# Patient Record
Sex: FEMALE | Race: WHITE | Employment: FULL TIME | URBAN - METROPOLITAN AREA
[De-identification: names, ages, dates, MRNs, and addresses within clinical notes are randomized per-mention and may not be internally consistent; named-entity substitution may affect disease eponyms.]

---

## 2019-01-29 ENCOUNTER — OFFICE VISIT (OUTPATIENT)
Dept: INTERNAL MEDICINE CLINIC | Facility: CLINIC | Age: 59
End: 2019-01-29
Payer: COMMERCIAL

## 2019-01-29 VITALS
DIASTOLIC BLOOD PRESSURE: 82 MMHG | WEIGHT: 149.81 LBS | BODY MASS INDEX: 25.57 KG/M2 | SYSTOLIC BLOOD PRESSURE: 134 MMHG | TEMPERATURE: 98 F | HEIGHT: 64 IN | HEART RATE: 64 BPM

## 2019-01-29 DIAGNOSIS — M79.641 PAIN IN BOTH HANDS: ICD-10-CM

## 2019-01-29 DIAGNOSIS — M79.642 PAIN IN BOTH HANDS: ICD-10-CM

## 2019-01-29 DIAGNOSIS — R20.0 NUMBNESS IN BOTH HANDS: Primary | ICD-10-CM

## 2019-01-29 PROCEDURE — 99203 OFFICE O/P NEW LOW 30 MIN: CPT | Performed by: INTERNAL MEDICINE

## 2019-01-29 RX ORDER — PREDNISONE 1 MG/1
TABLET ORAL
Refills: 1 | COMMUNITY
Start: 2018-10-08 | End: 2019-06-10

## 2019-01-29 RX ORDER — MELOXICAM 15 MG/1
TABLET ORAL
Refills: 3 | COMMUNITY
Start: 2018-12-27

## 2019-01-29 RX ORDER — GABAPENTIN 100 MG/1
100 CAPSULE ORAL NIGHTLY
Qty: 30 CAPSULE | Refills: 2 | Status: SHIPPED | OUTPATIENT
Start: 2019-01-29

## 2019-01-29 NOTE — PROGRESS NOTES
Freddy Schmitt is a 60year old female.   Patient presents with:  New Patient: cn room 3: patient is here for numbing pain  in R arm, patient also feels it in her hand an feels like its getting worse , getting worse       HPI:      For the last 6 months, pa Patient Position: Sitting, Cuff Size: adult)   Pulse 64   Temp 97.7 °F (36.5 °C) (Oral)   Ht 64\"   Wt 149 lb 12.8 oz   BMI 25.71 kg/m²   GENERAL: well developed, NAD  LUNGS: normal rate without respiratory distress, lungs clear to auscultation  CARDIO: RR

## 2019-02-05 LAB
ABSOLUTE BASOPHILS: 60 CELLS/UL (ref 0–200)
ABSOLUTE EOSINOPHILS: 270 CELLS/UL (ref 15–500)
ABSOLUTE LYMPHOCYTES: 2805 CELLS/UL (ref 850–3900)
ABSOLUTE MONOCYTES: 420 CELLS/UL (ref 200–950)
ABSOLUTE NEUTROPHILS: 3945 CELLS/UL (ref 1500–7800)
ALBUMIN/GLOBULIN RATIO: 1.4 (CALC) (ref 1–2.5)
ALBUMIN: 4.2 G/DL (ref 3.6–5.1)
ALKALINE PHOSPHATASE: 103 U/L (ref 33–130)
ALT: 13 U/L (ref 6–29)
AST: 18 U/L (ref 10–35)
BASOPHILS: 0.8 %
BILIRUBIN, TOTAL: 0.6 MG/DL (ref 0.2–1.2)
BUN: 11 MG/DL (ref 7–25)
CALCIUM: 9.4 MG/DL (ref 8.6–10.4)
CARBON DIOXIDE: 27 MMOL/L (ref 20–32)
CHLORIDE: 103 MMOL/L (ref 98–110)
CREATININE: 0.77 MG/DL (ref 0.5–0.99)
EGFR IF AFRICN AM: 97 ML/MIN/1.73M2
EGFR IF NONAFRICN AM: 84 ML/MIN/1.73M2
EOSINOPHILS: 3.6 %
GLOBULIN: 2.9 G/DL (CALC) (ref 1.9–3.7)
GLUCOSE: 92 MG/DL (ref 65–99)
HEMATOCRIT: 39 % (ref 35–45)
HEMOGLOBIN A1C: 5.2 % OF TOTAL HGB
HEMOGLOBIN: 13 G/DL (ref 11.7–15.5)
LYMPHOCYTES: 37.4 %
MCH: 29 PG (ref 27–33)
MCHC: 33.3 G/DL (ref 32–36)
MCV: 87.1 FL (ref 80–100)
MONOCYTES: 5.6 %
MPV: 9.7 FL (ref 7.5–12.5)
NEUTROPHILS: 52.6 %
PLATELET COUNT: 326 THOUSAND/UL (ref 140–400)
POTASSIUM: 4.1 MMOL/L (ref 3.5–5.3)
PROTEIN, TOTAL: 7.1 G/DL (ref 6.1–8.1)
RDW: 12.9 % (ref 11–15)
RED BLOOD CELL COUNT: 4.48 MILLION/UL (ref 3.8–5.1)
SODIUM: 139 MMOL/L (ref 135–146)
TSH W/REFLEX TO FT4: 2.44 MIU/L (ref 0.4–4.5)
VITAMIN B12: 461 PG/ML (ref 200–1100)
WHITE BLOOD CELL COUNT: 7.5 THOUSAND/UL (ref 3.8–10.8)

## 2019-02-12 ENCOUNTER — TELEPHONE (OUTPATIENT)
Dept: INTERNAL MEDICINE CLINIC | Facility: CLINIC | Age: 59
End: 2019-02-12

## 2019-02-12 NOTE — TELEPHONE ENCOUNTER
Ok to take tylenol and gabapentin at same time.  Lets see what the work up shows before we can decide on what else can help with pain

## 2019-02-12 NOTE — TELEPHONE ENCOUNTER
Spoke with patient informed per TB ok to take Tylenol and gabapentin at the same time. Lets see what the work up shows before we can decide on what else can help with pain. Patient verbalized understanding and agreeable to POC.

## 2019-02-12 NOTE — TELEPHONE ENCOUNTER
Pt called and stated that her hand pain is much worse and is becoming daily and she isnt able to get the EMG done that TB ordered until Feb 26th and would like to know if there is anything else we can do t ohelp with the pain

## 2019-02-12 NOTE — TELEPHONE ENCOUNTER
Spoke with patient states continues to experience numbness and pain bilateral upper extremities right greater than left, has been using wrist splints at night mostly-unable to do much during the day with them on, continues to take gabapentin, meloxicam, an

## 2019-03-04 PROBLEM — G56.03 CARPAL TUNNEL SYNDROME, BILATERAL: Status: ACTIVE | Noted: 2019-03-04

## 2019-04-23 ENCOUNTER — TELEPHONE (OUTPATIENT)
Dept: INTERNAL MEDICINE CLINIC | Facility: CLINIC | Age: 59
End: 2019-04-23

## 2019-06-10 ENCOUNTER — OFFICE VISIT (OUTPATIENT)
Dept: INTERNAL MEDICINE CLINIC | Facility: CLINIC | Age: 59
End: 2019-06-10
Payer: COMMERCIAL

## 2019-06-10 VITALS
DIASTOLIC BLOOD PRESSURE: 88 MMHG | SYSTOLIC BLOOD PRESSURE: 138 MMHG | HEART RATE: 72 BPM | WEIGHT: 149.81 LBS | RESPIRATION RATE: 16 BRPM | BODY MASS INDEX: 25.57 KG/M2 | HEIGHT: 64 IN

## 2019-06-10 DIAGNOSIS — M81.0 OSTEOPOROSIS OF MULTIPLE SITES: ICD-10-CM

## 2019-06-10 DIAGNOSIS — M31.6 GIANT CELL ARTERITIS (HCC): ICD-10-CM

## 2019-06-10 DIAGNOSIS — G56.03 CARPAL TUNNEL SYNDROME, BILATERAL: ICD-10-CM

## 2019-06-10 DIAGNOSIS — Z00.00 ROUTINE GENERAL MEDICAL EXAMINATION AT A HEALTH CARE FACILITY: Primary | ICD-10-CM

## 2019-06-10 PROCEDURE — 99396 PREV VISIT EST AGE 40-64: CPT | Performed by: INTERNAL MEDICINE

## 2019-06-10 RX ORDER — ALENDRONATE SODIUM 70 MG/1
70 TABLET ORAL
COMMUNITY
Start: 2019-05-10 | End: 2020-05-09

## 2019-06-10 RX ORDER — ACETAMINOPHEN 500 MG
1000 TABLET ORAL
COMMUNITY
Start: 2017-12-05

## 2019-06-10 RX ORDER — MULTIVIT-MIN/IRON/FOLIC ACID/K 18-600-40
1 CAPSULE ORAL
COMMUNITY

## 2019-06-10 RX ORDER — LORATADINE 10 MG/1
10 TABLET ORAL
COMMUNITY

## 2019-06-10 RX ORDER — PREDNISONE 10 MG/1
TABLET ORAL
Refills: 2 | COMMUNITY
Start: 2019-04-08

## 2019-06-10 RX ORDER — SULFAMETHOXAZOLE AND TRIMETHOPRIM 800; 160 MG/1; MG/1
1 TABLET ORAL
COMMUNITY
Start: 2019-02-22

## 2019-06-10 NOTE — PROGRESS NOTES
Patient presents with:  Physical: cn room 3: physical and discuss [facility] findings       HPI:    Patient here for cpe, has own gyne, Dr. Hou Carrier who she sees regularly.  utd on pap and mammogram (pap requested today)  cscope 2 years ago Dr. Cristel figueroa Rfl: 2   Ascorbic Acid (VITAMIN C) 500 MG Oral Cap Take 1 tablet by mouth. Disp:  Rfl:    alendronate 70 MG Oral Tab Take 70 mg by mouth. Disp:  Rfl:    acetaminophen 500 MG Oral Tab Take 1,000 mg by mouth.  Disp:  Rfl:    Esomeprazole Magnesium 20 MG Oral icterus. Neck: Normal range of motion. Neck supple. Cardiovascular: Normal rate, regular rhythm and intact distal pulses. Pulmonary/Chest: Effort normal and breath sounds normal.   Abdominal: Soft.  Bowel sounds are normal. Non tender, nondistended  L

## 2019-06-11 ENCOUNTER — TELEPHONE (OUTPATIENT)
Dept: INTERNAL MEDICINE CLINIC | Facility: CLINIC | Age: 59
End: 2019-06-11

## 2019-06-11 NOTE — TELEPHONE ENCOUNTER
Medical records request faxed to Dr Florentin Ward [phone] requesting last colonoscopy report , confirmation received please hold for records

## 2019-06-11 NOTE — TELEPHONE ENCOUNTER
Medical records request faxed to Dr. Carmen Payton  for last pap smear to be faxed over confirmation received please hold for records

## 2021-04-22 ENCOUNTER — TELEPHONE (OUTPATIENT)
Dept: INTERNAL MEDICINE CLINIC | Facility: CLINIC | Age: 61
End: 2021-04-22

## 2021-04-23 NOTE — TELEPHONE ENCOUNTER
CPE   Future Appointments   Date Time Provider Altagracia Quintero   5/20/2021  2:20 PM Erich Hernandez MD [facility]        Pt had labs done in Feb and stated she will send the results by mail.  She doesn't think she needs to repeat any labs at [facility]

## 2021-04-23 NOTE — TELEPHONE ENCOUNTER
HM includes multiple gaps including CPE and pap. Last CPE done 2019    FWD to PSR, please assist in scheduling CPE  Route back for lab orders.

## 2021-04-23 NOTE — TELEPHONE ENCOUNTER
Spoke with Ernestine Castaneda. Pt recently had labs / [facility] shows as follows. .    CBC done 4/2021  Lipid done 2/2021  Creatinine/ALT done 4/2021    Pt states she will be having repeat labs done again with [place] in 6/2021.   Wonders if lab orde

## 2021-05-20 ENCOUNTER — OFFICE VISIT (OUTPATIENT)
Dept: INTERNAL MEDICINE CLINIC | Facility: CLINIC | Age: 61
End: 2021-05-20
Payer: COMMERCIAL

## 2021-05-20 VITALS
OXYGEN SATURATION: 97 % | HEIGHT: 64.5 IN | SYSTOLIC BLOOD PRESSURE: 134 MMHG | RESPIRATION RATE: 16 BRPM | TEMPERATURE: 98 F | DIASTOLIC BLOOD PRESSURE: 84 MMHG | WEIGHT: 160 LBS | HEART RATE: 75 BPM | BODY MASS INDEX: 26.98 KG/M2

## 2021-05-20 DIAGNOSIS — Z00.00 ROUTINE GENERAL MEDICAL EXAMINATION AT A HEALTH CARE FACILITY: Primary | ICD-10-CM

## 2021-05-20 DIAGNOSIS — M31.6 GIANT CELL ARTERITIS (HCC): ICD-10-CM

## 2021-05-20 DIAGNOSIS — R14.0 ABDOMINAL BLOATING: ICD-10-CM

## 2021-05-20 DIAGNOSIS — R06.00 DOE (DYSPNEA ON EXERTION): ICD-10-CM

## 2021-05-20 PROBLEM — R06.09 DOE (DYSPNEA ON EXERTION): Status: ACTIVE | Noted: 2021-05-20

## 2021-05-20 PROCEDURE — 99396 PREV VISIT EST AGE 40-64: CPT | Performed by: INTERNAL MEDICINE

## 2021-05-20 PROCEDURE — 3075F SYST BP GE 130 - 139MM HG: CPT | Performed by: INTERNAL MEDICINE

## 2021-05-20 PROCEDURE — 3079F DIAST BP 80-89 MM HG: CPT | Performed by: INTERNAL MEDICINE

## 2021-05-20 PROCEDURE — 3008F BODY MASS INDEX DOCD: CPT | Performed by: INTERNAL MEDICINE

## 2021-05-20 RX ORDER — ALENDRONATE SODIUM 70 MG/1
70 TABLET ORAL WEEKLY
COMMUNITY
Start: 2019-05-10

## 2021-05-20 NOTE — PROGRESS NOTES
Patient presents with:  Physical: sees Gyne.        HPI:    Patient here for cpe without gyne exam  utd on pap, due for mammogram and she has order already from her gyne   utd on cscope  Giant cell arteritis- sees rheum Dr. Inge Gonzales from [facility], [facility] pre alendronate 70 MG Oral Tab Take 70 mg by mouth once a week. • Ascorbic Acid (VITAMIN C) 500 MG Oral Cap Take 1 tablet by mouth. • cholecalciferol (VITAMIN D3) 5000 units Oral Cap Take 5,000 Units by mouth daily.      • Calcium Carbonate (CALCIUM 600 Oriented to person, place, and time. No distress. HEENT:  Normocephalic and atraumatic. Hearing and tympanic membranes normal.     Eyes: Conjunctivae and EOM are normal. PERRLA. No scleral icterus. Neck: Normal range of motion. Neck supple.  Normal arambula

## 2021-05-21 ENCOUNTER — TELEPHONE (OUTPATIENT)
Dept: INTERNAL MEDICINE CLINIC | Facility: CLINIC | Age: 61
End: 2021-05-21

## 2021-05-21 NOTE — TELEPHONE ENCOUNTER
Pt had CPE yesterday with TB. She is extremely dizzy today and can barely move around. Felt like she could faint this am but caught herself, please advise?

## 2021-05-21 NOTE — TELEPHONE ENCOUNTER
Patient contacted to discuss symptoms. Patient states got up from bed this AM and when she looked to the left she felt she was going to faint. Patient states she grabbed the door frame to prevent fall. Patient states felt clammy and sweaty.  Patient states

## 2021-06-01 ENCOUNTER — LAB ENCOUNTER (OUTPATIENT)
Dept: LAB | Age: 61
End: 2021-06-01
Attending: INTERNAL MEDICINE
Payer: COMMERCIAL

## 2021-06-01 DIAGNOSIS — R06.00 DOE (DYSPNEA ON EXERTION): ICD-10-CM

## 2021-06-04 ENCOUNTER — RT VISIT (OUTPATIENT)
Dept: RESPIRATORY THERAPY | Facility: HOSPITAL | Age: 61
End: 2021-06-04
Attending: INTERNAL MEDICINE
Payer: COMMERCIAL

## 2021-06-04 DIAGNOSIS — R06.00 DOE (DYSPNEA ON EXERTION): ICD-10-CM

## 2021-06-04 PROCEDURE — 94729 DIFFUSING CAPACITY: CPT

## 2021-06-04 PROCEDURE — 94726 PLETHYSMOGRAPHY LUNG VOLUMES: CPT

## 2021-06-04 PROCEDURE — 94060 EVALUATION OF WHEEZING: CPT

## 2021-06-04 NOTE — PROCEDURES
Findings:  Postbronchodilator FEV1 is 2.49L, 101% predicted. Postbronchodilator FVC is 3.36L, 107% predicted. FEV1/ FVC ratio is 0.74. There is no significant bronchodilator response after   administration of albuterol.    The flow-volume loop demonstrat

## 2021-06-17 ENCOUNTER — HOSPITAL ENCOUNTER (OUTPATIENT)
Dept: ULTRASOUND IMAGING | Age: 61
Discharge: HOME | End: 2021-06-17
Attending: INTERNAL MEDICINE
Payer: COMMERCIAL

## 2021-06-17 DIAGNOSIS — R14.0 ABDOMINAL BLOATING: ICD-10-CM

## 2021-06-17 PROCEDURE — 76700 US EXAM ABDOM COMPLETE: CPT | Performed by: INTERNAL MEDICINE

## 2021-06-21 DIAGNOSIS — K82.4 GALLBLADDER POLYP: Primary | ICD-10-CM

## 2021-06-21 DIAGNOSIS — Z09 FOLLOW-UP EXAM, 7 MONTHS TO 1 YEAR SINCE PREVIOUS EXAM: ICD-10-CM

## 2022-02-18 ENCOUNTER — IMMUNIZATION (OUTPATIENT)
Dept: LAB | Age: 62
End: 2022-02-18
Attending: EMERGENCY MEDICINE
Payer: COMMERCIAL

## 2022-02-18 DIAGNOSIS — Z23 NEED FOR VACCINATION: Primary | ICD-10-CM

## 2022-02-18 PROCEDURE — 0051A SARSCOV2 VAC 30MCG TRS SUCR: CPT

## 2022-03-11 ENCOUNTER — TELEPHONE (OUTPATIENT)
Dept: INTERNAL MEDICINE CLINIC | Facility: CLINIC | Age: 62
End: 2022-03-11

## 2023-04-27 ENCOUNTER — OFFICE VISIT (OUTPATIENT)
Dept: INTERNAL MEDICINE CLINIC | Facility: CLINIC | Age: 63
End: 2023-04-27
Payer: COMMERCIAL

## 2023-04-27 VITALS
BODY MASS INDEX: 27.18 KG/M2 | HEIGHT: 64.57 IN | TEMPERATURE: 97 F | WEIGHT: 161.19 LBS | RESPIRATION RATE: 16 BRPM | OXYGEN SATURATION: 95 % | SYSTOLIC BLOOD PRESSURE: 128 MMHG | DIASTOLIC BLOOD PRESSURE: 80 MMHG | HEART RATE: 80 BPM

## 2023-04-27 DIAGNOSIS — M31.6 GIANT CELL ARTERITIS (HCC): ICD-10-CM

## 2023-04-27 DIAGNOSIS — I71.21 ANEURYSM OF ASCENDING AORTA WITHOUT RUPTURE (HCC): ICD-10-CM

## 2023-04-27 DIAGNOSIS — Z23 NEED FOR TDAP VACCINATION: ICD-10-CM

## 2023-04-27 DIAGNOSIS — R09.89 LABILE BLOOD PRESSURE: ICD-10-CM

## 2023-04-27 DIAGNOSIS — Z00.00 ROUTINE GENERAL MEDICAL EXAMINATION AT A HEALTH CARE FACILITY: Primary | ICD-10-CM

## 2023-04-27 DIAGNOSIS — E78.49 OTHER HYPERLIPIDEMIA: ICD-10-CM

## 2023-04-27 PROCEDURE — 3074F SYST BP LT 130 MM HG: CPT | Performed by: INTERNAL MEDICINE

## 2023-04-27 PROCEDURE — 3079F DIAST BP 80-89 MM HG: CPT | Performed by: INTERNAL MEDICINE

## 2023-04-27 PROCEDURE — 99396 PREV VISIT EST AGE 40-64: CPT | Performed by: INTERNAL MEDICINE

## 2023-04-27 PROCEDURE — 90471 IMMUNIZATION ADMIN: CPT | Performed by: INTERNAL MEDICINE

## 2023-04-27 PROCEDURE — 3008F BODY MASS INDEX DOCD: CPT | Performed by: INTERNAL MEDICINE

## 2023-04-27 PROCEDURE — 90715 TDAP VACCINE 7 YRS/> IM: CPT | Performed by: INTERNAL MEDICINE

## 2023-04-27 RX ORDER — ROSUVASTATIN CALCIUM 5 MG/1
5 TABLET, COATED ORAL NIGHTLY
Qty: 90 TABLET | Refills: 3 | Status: SHIPPED | OUTPATIENT
Start: 2023-04-27

## 2023-04-27 RX ORDER — ESTRADIOL 0.1 MG/G
0.1 CREAM VAGINAL AS NEEDED
COMMUNITY
Start: 2021-04-08

## 2023-04-27 RX ORDER — TOCILIZUMAB 180 MG/ML
0.9 INJECTION, SOLUTION SUBCUTANEOUS
COMMUNITY
Start: 2021-03-05

## 2023-04-27 RX ORDER — LORATADINE 10 MG/1
10 TABLET ORAL DAILY
COMMUNITY

## 2023-04-28 PROBLEM — E78.49 OTHER HYPERLIPIDEMIA: Status: ACTIVE | Noted: 2023-04-28

## 2023-04-28 PROBLEM — I71.21 ANEURYSM OF ASCENDING AORTA WITHOUT RUPTURE: Status: ACTIVE | Noted: 2023-04-28

## 2023-04-28 PROBLEM — R09.89 LABILE BLOOD PRESSURE: Status: ACTIVE | Noted: 2023-04-28

## 2023-04-28 PROBLEM — I71.21 ANEURYSM OF ASCENDING AORTA WITHOUT RUPTURE (HCC): Status: ACTIVE | Noted: 2023-04-28

## 2023-06-13 LAB
ALBUMIN/GLOBULIN RATIO: 2 (CALC) (ref 1–2.5)
ALBUMIN: 4.3 G/DL (ref 3.6–5.1)
ALKALINE PHOSPHATASE: 79 U/L (ref 37–153)
ALT: 28 U/L (ref 6–29)
AST: 29 U/L (ref 10–35)
BILIRUBIN, DIRECT: 0.1 MG/DL
BILIRUBIN, INDIRECT: 0.6 MG/DL (CALC) (ref 0.2–1.2)
BILIRUBIN, TOTAL: 0.7 MG/DL (ref 0.2–1.2)
CHOL/HDLC RATIO: 2.4 (CALC)
CHOLESTEROL, TOTAL: 200 MG/DL
GLOBULIN: 2.1 G/DL (CALC) (ref 1.9–3.7)
HDL CHOLESTEROL: 83 MG/DL
LDL-CHOLESTEROL: 96 MG/DL (CALC)
NON-HDL CHOLESTEROL: 117 MG/DL (CALC)
PROTEIN, TOTAL: 6.4 G/DL (ref 6.1–8.1)
TRIGLYCERIDES: 118 MG/DL
TSH W/REFLEX TO FT4: 2.93 MIU/L (ref 0.4–4.5)

## 2024-06-20 DIAGNOSIS — E78.49 OTHER HYPERLIPIDEMIA: ICD-10-CM

## 2024-06-24 NOTE — TELEPHONE ENCOUNTER
Please review; protocol failed/ has no protocol    No active /future labs noted     Eugene Fibnkg12 minutes ago (11:45 AM)     EM  Patient stated she can't come sooner than below, but still need her medication.  Patient added she's seeing Dr. Estrada, the cardiologist that Dr. Vilma Kc referred her to.  Patient scheduled on below.  Patient hoping to get refill         Future Appointments   Date Time Provider Department Center   9/16/2024 10:40 AM Vilma Kc MD [facility]               Requested Prescriptions   Pending Prescriptions Disp Refills    rosuvastatin 5 MG Oral Tab [Pharmacy Med Name: ROSUVASTATIN 5MG TABLETS] 90 tablet 3     Sig: Take 1 tablet (5 mg total) by mouth nightly.       Cholesterol Medication Protocol Failed - 6/24/2024 11:45 AM        Failed - ALT < 80     Lab Results   Component Value Date    ALT 28 06/12/2023             Failed - ALT resulted within past year        Failed - Lipid panel within past 12 months     Lab Results   Component Value Date    CHOLEST 200 (H) 06/12/2023    TRIG 118 06/12/2023    HDL 83 06/12/2023    LDL 96 06/12/2023    TCHDLRATIO 2.4 06/12/2023    NONHDLC 117 06/12/2023             Passed - In person appointment or virtual visit in the past 12 mos or appointment in next 3 mos     Recent Outpatient Visits              1 year ago Routine general medical examination at a health care facility    [address], Vilma Figueroa MD    Office Visit    3 years ago Routine general medical examination at a health care facility    [address]    Office Visit    5 years ago Routine general medical examination at a health care facility    [address]    Office Visit    5 years ago Carpal tunnel syndrome, bilateral    Orthopaedics - [address], Brian, MD    Office Visit    5 years  ago Numbness in both hands    [address] Vilma Figueroa MD    Office Visit          Future Appointments         Provider Department Appt Notes    In 2 months Vilma Kc MD [place] [phone] welcome to Medicare                       Recent Outpatient Visits              1 year ago Routine general medical examination at a health care facility    [address] Vilma Figueroa MD    Office Visit    3 years ago Routine general medical examination at a health care facility    [address] Vilma Figueroa MD    Office Visit    5 years ago Routine general medical examination at a health care facility    [address] Vilma Figueroa MD    Office Visit    5 years ago Carpal tunnel syndrome, bilateral    Orthopaedics - Jamil Dao, Cristóbal Lemus MD    Office Visit    5 years ago Numbness in both hands    [address]    Office Visit          Future Appointments         Provider Department Appt Notes    In 2 months Vilma Kc MD [place] [phone] welcome to Medicare

## 2024-06-24 NOTE — TELEPHONE ENCOUNTER
Patient stated she can't come sooner than below, but still need her medication.  Patient added she's seeing Dr. Estrada, the cardiologist that Dr. Vilma Kc referred her to.  Patient scheduled on below.  Patient hoping to get refill  Future Appointments   Date Time Provider Department Center   9/16/2024 10:40 AM Vilma Kc MD [facility]

## 2024-06-24 NOTE — TELEPHONE ENCOUNTER
Please review. Protocol Failed; No Protocol    No future appointments.    Routed to Patient  for assistance with appointment.     Requested Prescriptions   Pending Prescriptions Disp Refills    ROSUVASTATIN 5 MG Oral Tab [Pharmacy Med Name: ROSUVASTATIN 5MG TABLETS] 90 tablet 3     Sig: TAKE 1 TABLET(5 MG) BY MOUTH EVERY NIGHT       Cholesterol Medication Protocol Failed - 6/20/2024  4:54 PM        Failed - ALT < 80     Lab Results   Component Value Date    ALT 28 06/12/2023             Failed - ALT resulted within past year        Failed - Lipid panel within past 12 months     Lab Results   Component Value Date    CHOLEST 200 (H) 06/12/2023    TRIG 118 06/12/2023    HDL 83 06/12/2023    LDL 96 06/12/2023    TCHDLRATIO 2.4 06/12/2023    NONHDLC 117 06/12/2023             Failed - In person appointment or virtual visit in the past 12 mos or appointment in next 3 mos     Recent Outpatient Visits              1 year ago Routine general medical examination at a health care facility    [address]    Office Visit    3 years ago Routine general medical examination at a health care facility    [address]    Office Visit    5 years ago Routine general medical examination at a health care facility    [address]    Office Visit    5 years ago Carpal tunnel syndrome, bilateral    Orthopaedics - Jam[facility] Dao, Cristóbal Lemus MD    Office Visit    5 years ago Numbness in both hands    [address]    Office Visit                               Recent Outpatient Visits              1 year ago Routine general medical examination at a health care facility    [address]    Office Visit    3  years ago Routine general medical examination at a health care facility    [facility], [address] [place] [address], Vilma Figueroa MD    Office Visit    5 years ago Routine general medical examination at a health care facility    [facility], [address] [place] [address], Vilma Figueroa MD    Office Visit    5 years ago Carpal tunnel syndrome, bilateral    Orthopaedics - Jamil Dao, Cristóbal Lemus MD    Office Visit    5 years ago Numbness in both hands    [facility], [address] [place] [address], Vilma Figueroa MD    Office Visit

## 2024-06-25 RX ORDER — ROSUVASTATIN CALCIUM 5 MG/1
5 TABLET, COATED ORAL NIGHTLY
Qty: 90 TABLET | Refills: 0 | Status: SHIPPED | OUTPATIENT
Start: 2024-06-25

## 2024-09-03 DIAGNOSIS — Z00.00 ROUTINE GENERAL MEDICAL EXAMINATION AT HEALTH CARE FACILITY: Primary | ICD-10-CM

## 2024-09-03 DIAGNOSIS — Z13.220 SCREENING FOR HYPERLIPIDEMIA: ICD-10-CM

## 2024-09-03 DIAGNOSIS — Z13.29 SCREENING FOR THYROID DISORDER: ICD-10-CM

## 2024-09-03 DIAGNOSIS — Z13.228 SCREENING FOR METABOLIC DISORDER: ICD-10-CM

## 2024-09-03 DIAGNOSIS — Z13.0 SCREENING FOR BLOOD DISEASE: ICD-10-CM

## 2024-09-12 ENCOUNTER — TELEPHONE (OUTPATIENT)
Dept: INTERNAL MEDICINE CLINIC | Facility: CLINIC | Age: 64
End: 2024-09-12

## 2024-09-12 DIAGNOSIS — M81.0 OSTEOPOROSIS OF MULTIPLE SITES: ICD-10-CM

## 2024-09-12 DIAGNOSIS — R20.0 NUMBNESS IN BOTH HANDS: Primary | ICD-10-CM

## 2024-09-12 DIAGNOSIS — E78.49 OTHER HYPERLIPIDEMIA: ICD-10-CM

## 2024-09-12 DIAGNOSIS — R09.89 LABILE BLOOD PRESSURE: ICD-10-CM

## 2024-09-12 DIAGNOSIS — M31.6 GIANT CELL ARTERITIS (HCC): ICD-10-CM

## 2024-09-12 NOTE — TELEPHONE ENCOUNTER
Labs reordered for Qwilr with correct dx codes.    Felicia at [facility] [phone] notified.

## 2024-09-12 NOTE — TELEPHONE ENCOUNTER
You 9/12/2024 8:32 AM  Cornelia Ford.  Female, 64 year old, 1/12/1960  patient at [facility] now and she has medicare and the coding is routine and medicare will not cover routine-please put new codes for diagnostic and then call [facility] back when done so they can draw her blood-their number is [phone] Felicia called from [facility]

## 2024-09-16 ENCOUNTER — OFFICE VISIT (OUTPATIENT)
Dept: INTERNAL MEDICINE CLINIC | Facility: CLINIC | Age: 64
End: 2024-09-16
Payer: MEDICARE

## 2024-09-16 VITALS
HEART RATE: 70 BPM | TEMPERATURE: 97 F | WEIGHT: 157.19 LBS | RESPIRATION RATE: 18 BRPM | SYSTOLIC BLOOD PRESSURE: 126 MMHG | OXYGEN SATURATION: 97 % | BODY MASS INDEX: 27.17 KG/M2 | HEIGHT: 63.78 IN | DIASTOLIC BLOOD PRESSURE: 80 MMHG

## 2024-09-16 DIAGNOSIS — M85.80 OSTEOPENIA, UNSPECIFIED LOCATION: ICD-10-CM

## 2024-09-16 DIAGNOSIS — Z00.00 ENCOUNTER FOR MEDICARE ANNUAL WELLNESS EXAM: Primary | ICD-10-CM

## 2024-09-16 DIAGNOSIS — Z12.31 ENCOUNTER FOR SCREENING MAMMOGRAM FOR MALIGNANT NEOPLASM OF BREAST: ICD-10-CM

## 2024-09-16 DIAGNOSIS — E78.49 OTHER HYPERLIPIDEMIA: ICD-10-CM

## 2024-09-16 DIAGNOSIS — I71.21 ANEURYSM OF ASCENDING AORTA WITHOUT RUPTURE (HCC): ICD-10-CM

## 2024-09-16 DIAGNOSIS — M31.6 GIANT CELL ARTERITIS (HCC): ICD-10-CM

## 2024-09-16 PROBLEM — M81.0 OSTEOPOROSIS OF MULTIPLE SITES: Status: RESOLVED | Noted: 2019-06-10 | Resolved: 2024-09-16

## 2024-09-16 NOTE — PROGRESS NOTES
Subjective:   Cornelia Ford is a 64 year old female who presents for a Medicare Initial Preventative Physical Exam (Welcome to Medicare- < 12 months on Medicare) and scheduled follow up of multiple significant but stable problems.     Pleasant patient with giant cell arteritis (followed at [place] by Dr. Sanchez), aortic aneurysm without rupture, HL here for wellness exam.  Overall feels well, some aches and pain in shoulders, knees and hands but nothing that is extreme. No f/c/weight loss/HA  HL- on rosuvastatin 5mg daily, will do lipids soon thru [facility]  Osteopenia- off fosamax now (took for 5 years), still taking ca and vit d  Plans to see her gyne soon for routine visit, overdue on mammogram  She has not been on Acterma since June for giant cell arteritis, plans to discuss next steps soon with Dr. Sanchez  Declined all vaccines, feels sick with vaccinations.  Up to date on colonoscopy. No tobacco use since 2017    History/Other:   Fall Risk Assessment:   She has been screened for Falls and is low risk.      Cognitive Assessment:   She had a completely normal cognitive assessment - see flowsheet entries     Functional Ability/Status:   Cornelia Ford has some abnormal functions as listed below:  She has difficulties Affording Meds based on screening of functional status.       Depression Screening (PHQ):  PHQ-2 SCORE: 0  , done 9/16/2024   Last [place] Suicide Screening on 9/16/2024 was No Risk.         Advanced Directives:   She does have a Living Will but we do NOT have it on file in Epic.    She does have a POA but we do NOT have it on file in Epic.    Not discussed      Patient Active Problem List   Diagnosis    Numbness in both hands    Pain in both hands    Carpal tunnel syndrome, bilateral    Giant cell arteritis (HCC)    MORATAYA (dyspnea on exertion)    Abdominal bloating    Aneurysm of ascending aorta without rupture (HCC)    Other hyperlipidemia    Labile blood pressure    Osteopenia     Allergies:  She is  allergic to other, sulfa antibiotics, pollen extract, pollen, and sulfur.    Current Medications:  Outpatient Medications Marked as Taking for the 9/16/24 encounter (Office Visit) with Vilma Kc MD   Medication Sig    rosuvastatin 5 MG Oral Tab Take 1 tablet (5 mg total) by mouth nightly.    Esomeprazole Magnesium 20 MG Oral Capsule Delayed Release Take 1 capsule (20 mg total) by mouth before breakfast.    estradiol 0.1 MG/GM Vaginal Cream Place 0.1 g vaginally as needed.    loratadine 10 MG Oral Tab Take 1 tablet (10 mg total) by mouth daily.    Ascorbic Acid (VITAMIN C) 500 MG Oral Cap Take 1 tablet by mouth.    acetaminophen 500 MG Oral Tab Take 2 tablets (1,000 mg total) by mouth.    cholecalciferol (VITAMIN D3) 5000 units Oral Cap Take 1 capsule (5,000 Units total) by mouth daily.    Calcium Carbonate (CALCIUM 600 OR) Take 600 mg by mouth 2 (two) times daily.    Probiotic Product (PROBIOTIC OR) Take by mouth.       Medical History:  She  has no past medical history on file.  Surgical History:  She  has a past surgical history that includes colonoscopy (03/07/2017).   Family History:  Her family history is not on file.  Social History:  She  reports that she has quit smoking. Her smokeless tobacco use includes chew. She reports current alcohol use. She reports that she does not use drugs.    Tobacco:  Social History     Tobacco Use   Smoking Status Former   Smokeless Tobacco Current    Types: Chew   Tobacco Comments    chews nicorete      E-Cigarettes/Vaping       Questions Responses    E-Cigarette Use Never User           Tobacco cessation counseling for <3 minutes.      CAGE Alcohol Screen:   CAGE screening score of 0 on 9/16/2024, showing low risk of alcohol abuse.      Patient Care Team:  Vilma Kc MD as PCP - General (Internal Medicine)    Review of Systems     Negative for f/c/CP    Objective:   Physical Exam  General Appearance:  Alert, cooperative, no distress, appears stated age   Head:   Normocephalic, without obvious abnormality, atraumatic   Eyes:  PERRL, conjunctiva/corneas clear, EOM's intact both eyes   Ears:  Normal TM's and external ear canals, both ears   Nose: Nares normal, septum midline,mucosa normal, no drainage or sinus tenderness   Throat: Lips, mucosa, and tongue normal   Neck: Supple, symmetrical, trachea midline, no JVD   Back:   No masses or lumps, no LAD   Lungs:   Clear to auscultation bilaterally, respirations unlabored   Heart:  Regular rate and rhythm, S1 and S2 normal   Abdomen:   Soft, non-tender, bowel sounds active all four quadrants,  no masses, no organomegaly   Pelvic: Deferred   Extremities: Extremities normal, atraumatic, no cyanosis or edema   Pulses: 2+ and symmetric   Skin: Skin color, texture, turgor normal, no rashes or lesions       Neurologic: Normal       /80 (BP Location: Left arm, Patient Position: Sitting, Cuff Size: large)   Pulse 70   Temp 97.2 °F (36.2 °C) (Temporal)   Resp 18   Ht 5' 3.78\" (1.62 m)   Wt 157 lb 3.2 oz (71.3 kg)   SpO2 97%   BMI 27.17 kg/m²  Estimated body mass index is 27.17 kg/m² as calculated from the following:    Height as of this encounter: 5' 3.78\" (1.62 m).    Weight as of this encounter: 157 lb 3.2 oz (71.3 kg).    Medicare Hearing Assessment:   Hearing Screening    Time taken: 9/16/2024 10:54 AM  Entry User: Cristel Jane MA  Screening Method: Finger Rub  Finger Rub Result: Pass         Visual Acuity:   Right Eye Visual Acuity: Corrected Right Eye Chart Acuity: 20/40   Left Eye Visual Acuity: Corrected Left Eye Chart Acuity: 20/40   Both Eyes Visual Acuity: Corrected Both Eyes Chart Acuity: 20/30   Able To Tolerate Visual Acuity: Yes        Assessment & Plan:   Cornelia Ford is a 64 year old female who presents for a Medicare Assessment.     1. Encounter for Medicare annual wellness exam (Primary)- referred for mammogram, she is up to date on colonoscopy and dexa scan. She declined all vaccinations at this  time. Encouraged heart healthy diet and regular exercise.   2. Encounter for screening mammogram for malignant neoplasm of breast  -     [facility] EARLE 2D+3D SCREENING BILAT (CPT=77067/43337); Future; Expected date: 09/16/2024  3. Giant cell arteritis (HCC)- [facility]  4. Aneurysm of ascending aorta without rupture (HCC)- stable on imaging done May 2024  5. Other hyperlipidemia- continue rosuvastatin, check lipids  -     Lipid Panel  6. Osteopenia, unspecified location- continue ca and vit d, she is on holiday from [place]    The patient indicates understanding of these issues and agrees to the plan.  Continue with current treatment plan.  Reinforced healthy diet, lifestyle, and exercise.      Return in about 1 year (around 9/16/2025) for we.     Vilma Kc MD, 9/16/2024     Supplementary Documentation:   General Health:  In the past six months, have you lost more than 10 pounds without trying?: 2 - No  Has your appetite been poor?: No  Type of Diet: Balanced  How does the patient maintain a good energy level?: Appropriate Exercise;Stretching  How would you describe your daily physical activity?: Moderate  How would you describe your current health state?: Good  How do you maintain positive mental well-being?: Social Interaction;Games;Visiting Friends;Visiting Family  On a scale of 0 to 10, with 0 being no pain and 10 being severe pain, what is your pain level?: 1 - (Mild) (arthritic fingers, hips and bilateral knee)  In the past six months, have you experienced urine leakage?: 1-Yes  At any time do you feel concerned for the safety/well-being of yourself and/or your children, in your home or elsewhere?: No  Have you had any immunizations at another office such as Influenza, Hepatitis B, Tetanus, or Pneumococcal?: No    Health Maintenance   Topic Date Due    TB Screen  Never done    Annual Depression Screening  01/01/2024    Fall Risk Screening (Annual)  Never done    Tobacco  Cessation Counseling  Never done    DEXA Scan  Never done    Pneumococcal Vaccine: 65+ Years (2 of 2 - PCV) 01/12/2024    Mammogram  03/29/2024    Annual Physical  04/27/2024    Pap Smear  07/24/2024    COVID-19 Vaccine (2 - 2023-24 season) 09/01/2024    Influenza Vaccine (1) 10/01/2024    Colorectal Cancer Screening  03/27/2027    Zoster Vaccines  Completed

## 2024-09-27 LAB
CHOL/HDLC RATIO: 2 (CALC)
CHOLESTEROL, TOTAL: 119 MG/DL
HDL CHOLESTEROL: 59 MG/DL
LDL-CHOLESTEROL: 45 MG/DL (CALC)
NON-HDL CHOLESTEROL: 60 MG/DL (CALC)
TRIGLYCERIDES: 73 MG/DL

## 2024-09-28 DIAGNOSIS — E78.49 OTHER HYPERLIPIDEMIA: ICD-10-CM

## 2024-10-02 RX ORDER — ROSUVASTATIN CALCIUM 5 MG/1
5 TABLET, COATED ORAL NIGHTLY
Qty: 90 TABLET | Refills: 1 | Status: SHIPPED | OUTPATIENT
Start: 2024-10-02

## 2024-10-02 NOTE — TELEPHONE ENCOUNTER
Please review. Protocol Failed; No Protocol    Message sent to patient to complete labs     Requested Prescriptions   Pending Prescriptions Disp Refills    ROSUVASTATIN 5 MG Oral Tab [Pharmacy Med Name: ROSUVASTATIN 5MG TABLETS] 90 tablet 0     Sig: TAKE 1 TABLET(5 MG) BY MOUTH EVERY NIGHT       Cholesterol Medication Protocol Failed - 9/28/2024 11:54 AM        Failed - ALT < 80     Lab Results   Component Value Date    ALT 28 06/12/2023             Failed - ALT resulted within past year        Passed - Lipid panel within past 12 months     Lab Results   Component Value Date    CHOLEST 119 09/26/2024    TRIG 73 09/26/2024    HDL 59 09/26/2024    LDL 45 09/26/2024    TCHDLRATIO 2.0 09/26/2024    NONHDLC 60 09/26/2024             Passed - In person appointment or virtual visit in the past 12 mos or appointment in next 3 mos     Recent Outpatient Visits              2 weeks ago Encounter for Medicare annual wellness exam    [address]    Office Visit    1 year ago Routine general medical examination at a health care facility    [address]    Office Visit    3 years ago Routine general medical examination at a health care facility    [address]    Office Visit    5 years ago Routine general medical examination at a health care facility    [address]    Office Visit    5 years ago Carpal tunnel syndrome, bilateral    Orthopaedics - Jamil Dao, Cristóbal Lemus MD    Office Visit                               Recent Outpatient Visits              2 weeks ago Encounter for Medicare annual wellness exam    [address]    Office Visit    1 year ago Routine general medical examination at a [facility]  facility    [facility], [address] [place] [address], Vilma Figueroa MD    Office Visit    3 years ago Routine general medical examination at a health care facility    [facility], [address] [place] [address], Vilma Figueroa MD    Office Visit    5 years ago Routine general medical examination at a health care facility    [facility], [address] [place] [address], Vilma Figueroa MD    Office Visit    5 years ago Carpal tunnel syndrome, bilateral    Orthopaedics - Jamil Dao, Cristóbal Lemus MD    Office Visit

## 2025-01-29 LAB
CHOL/HDLC RATIO: 2.3 (CALC)
CHOLESTEROL, TOTAL: 171 MG/DL
HDL CHOLESTEROL: 75 MG/DL
LDL-CHOLESTEROL: 78 MG/DL (CALC)
NON-HDL CHOLESTEROL: 96 MG/DL (CALC)
TRIGLYCERIDES: 94 MG/DL

## 2025-01-31 DIAGNOSIS — E78.49 OTHER HYPERLIPIDEMIA: ICD-10-CM

## 2025-02-04 RX ORDER — ROSUVASTATIN CALCIUM 5 MG/1
5 TABLET, COATED ORAL NIGHTLY
Qty: 90 TABLET | Refills: 1 | Status: SHIPPED | OUTPATIENT
Start: 2025-02-04

## 2025-02-04 NOTE — TELEPHONE ENCOUNTER
Please review. Protocol Failed; No Protocol    Requested Prescriptions   Pending Prescriptions Disp Refills    ROSUVASTATIN 5 MG Oral Tab [Pharmacy Med Name: ROSUVASTATIN 5MG TABLETS] 90 tablet 1     Sig: TAKE 1 TABLET(5 MG) BY MOUTH EVERY NIGHT       Cholesterol Medication Protocol Failed - 2/4/2025 10:34 AM        Failed - ALT < 80     Lab Results   Component Value Date    ALT 28 06/12/2023             Failed - ALT resulted within past year        Passed - Lipid panel within past 12 months     Lab Results   Component Value Date    CHOLEST 171 01/28/2025    TRIG 94 01/28/2025    HDL 75 01/28/2025    LDL 78 01/28/2025    TCHDLRATIO 2.3 01/28/2025    NONHDLC 96 01/28/2025             Passed - In person appointment or virtual visit in the past 12 mos or appointment in next 3 mos     Recent Outpatient Visits              4 months ago Encounter for Medicare annual wellness exam    [address]    Office Visit    1 year ago Routine general medical examination at a health care facility    [address]    Office Visit    3 years ago Routine general medical examination at a health care facility    [address]    Office Visit    5 years ago Routine general medical examination at a health care facility    [address]    Office Visit    5 years ago Carpal tunnel syndrome, bilateral    Orthopaedics - Jamil Dao, Cristóbal Lemus MD    Office Visit                      Passed - Medication is active on med list                 Recent Outpatient Visits              4 months ago Encounter for Medicare annual wellness exam    [address]    Office Visit    1 year ago Routine general medical examination at a [facility]  facility    [facility], [address] [place] [address], Vilma Figueroa MD    Office Visit    3 years ago Routine general medical examination at a health care facility    [facility], [address] [place] [address], Vilma Figueroa MD    Office Visit    5 years ago Routine general medical examination at a health care facility    [facility], [address] [place] [address], Vilma Figueroa MD    Office Visit    5 years ago Carpal tunnel syndrome, bilateral    Orthopaedics - Jamil Dao, Cristóbal Lemus MD    Office Visit

## 2025-02-07 DIAGNOSIS — E78.49 OTHER HYPERLIPIDEMIA: ICD-10-CM

## 2025-02-07 DIAGNOSIS — Z00.00 ENCOUNTER FOR ANNUAL HEALTH EXAMINATION: Primary | ICD-10-CM

## 2025-05-19 ENCOUNTER — TELEPHONE (OUTPATIENT)
Dept: INTERNAL MEDICINE CLINIC | Facility: CLINIC | Age: 65
End: 2025-05-19

## 2025-05-19 DIAGNOSIS — I71.40 ABDOMINAL AORTIC ANEURYSM (AAA) WITHOUT RUPTURE, UNSPECIFIED PART: Primary | ICD-10-CM

## 2025-05-19 NOTE — TELEPHONE ENCOUNTER
Patient called requesting an ambulatory BP cuff/remote monitoring for 24 hrs and unsure where to obtain this. States she is being monitored at [facility] and [facility] for AAA. She stated that [facility] notified her that her insurance would not cover the monitoring however patient is requesting if Dr. Kc's office uses a different company that would cover it.    Triage support - see pended card monitor ambulatory blood pressure order, can  you verify if patients insurance will cover?         3/12/25 ([facility])- Since hypertension is the #1 population-attributable risk factor for aortic dissection worldwide, it is of utmost importance to maintain normotension. Would recommend maintaining blood pressure at 130/80 mmHg or less (ideally 110-120/70s mmHg). Given persistent elevated blood pressure readings, will start on therapy with losartan (BMP check in 1-2 weeks). She will monitor her blood pressure readings and update my office or PCP if in the suboptimal range.

## 2025-05-20 NOTE — TELEPHONE ENCOUNTER
Attempted to call patient, left detailed voicemail for patient with message from triage support. Office number provided if patient has additional questions.

## 2025-05-20 NOTE — TELEPHONE ENCOUNTER
We cannot order this DME through [facility], nor do we have a vendor. This would be up to the patient to find a vendor for the DME or [facility] should have a vendor they can provide.    If not patient would have to contact their insurance to inquire about covered vendors.    Not for triage support.

## 2025-08-12 ENCOUNTER — HOSPITAL ENCOUNTER (OUTPATIENT)
Age: 65
Discharge: HOME | End: 2025-08-12
Attending: EMERGENCY MEDICINE

## 2025-08-12 ENCOUNTER — APPOINTMENT (OUTPATIENT)
Dept: CT IMAGING | Age: 65
End: 2025-08-12
Attending: EMERGENCY MEDICINE

## 2025-08-12 ENCOUNTER — NURSE TRIAGE (OUTPATIENT)
Dept: INTERNAL MEDICINE CLINIC | Facility: CLINIC | Age: 65
End: 2025-08-12

## 2025-08-12 VITALS
OXYGEN SATURATION: 98 % | HEART RATE: 61 BPM | HEIGHT: 64.5 IN | RESPIRATION RATE: 18 BRPM | WEIGHT: 157 LBS | BODY MASS INDEX: 26.48 KG/M2 | TEMPERATURE: 98 F | SYSTOLIC BLOOD PRESSURE: 147 MMHG | DIASTOLIC BLOOD PRESSURE: 51 MMHG

## 2025-08-12 DIAGNOSIS — R10.11 RUQ PAIN: Primary | ICD-10-CM

## 2025-08-12 PROCEDURE — 99214 OFFICE O/P EST MOD 30 MIN: CPT

## 2025-08-12 PROCEDURE — 99203 OFFICE O/P NEW LOW 30 MIN: CPT

## 2025-08-12 PROCEDURE — 74176 CT ABD & PELVIS W/O CONTRAST: CPT | Performed by: EMERGENCY MEDICINE

## 2025-08-12 RX ORDER — LOSARTAN POTASSIUM 100 MG/1
100 TABLET ORAL DAILY
COMMUNITY

## 2025-08-12 RX ORDER — CARVEDILOL 12.5 MG/1
18.75 TABLET ORAL 2 TIMES DAILY WITH MEALS
COMMUNITY

## 2025-08-14 ENCOUNTER — TELEPHONE (OUTPATIENT)
Dept: INTERNAL MEDICINE CLINIC | Facility: CLINIC | Age: 65
End: 2025-08-14

## 2025-08-19 ENCOUNTER — OFFICE VISIT (OUTPATIENT)
Dept: INTERNAL MEDICINE CLINIC | Facility: CLINIC | Age: 65
End: 2025-08-19

## 2025-08-19 VITALS
BODY MASS INDEX: 26.98 KG/M2 | HEART RATE: 53 BPM | OXYGEN SATURATION: 96 % | TEMPERATURE: 97 F | SYSTOLIC BLOOD PRESSURE: 108 MMHG | RESPIRATION RATE: 17 BRPM | DIASTOLIC BLOOD PRESSURE: 60 MMHG | HEIGHT: 64.5 IN | WEIGHT: 160 LBS

## 2025-08-19 DIAGNOSIS — K21.9 GASTROESOPHAGEAL REFLUX DISEASE, UNSPECIFIED WHETHER ESOPHAGITIS PRESENT: ICD-10-CM

## 2025-08-19 DIAGNOSIS — I71.21 ANEURYSM OF ASCENDING AORTA WITHOUT RUPTURE: ICD-10-CM

## 2025-08-19 DIAGNOSIS — E78.49 OTHER HYPERLIPIDEMIA: ICD-10-CM

## 2025-08-19 DIAGNOSIS — R21 RASH OF TOE: ICD-10-CM

## 2025-08-19 DIAGNOSIS — M31.6 GIANT CELL ARTERITIS (HCC): ICD-10-CM

## 2025-08-19 DIAGNOSIS — R10.11 RIGHT UPPER QUADRANT PAIN: Primary | ICD-10-CM

## 2025-08-19 DIAGNOSIS — R11.0 NAUSEA WITHOUT VOMITING: ICD-10-CM

## 2025-08-19 PROCEDURE — 99215 OFFICE O/P EST HI 40 MIN: CPT | Performed by: INTERNAL MEDICINE

## 2025-08-19 PROCEDURE — G2211 COMPLEX E/M VISIT ADD ON: HCPCS | Performed by: INTERNAL MEDICINE

## 2025-08-19 RX ORDER — PREDNISONE 5 MG/1
60 TABLET ORAL AS NEEDED
COMMUNITY

## 2025-08-20 LAB
ABSOLUTE BASOPHILS: 42 CELLS/UL (ref 0–200)
ABSOLUTE EOSINOPHILS: 334 CELLS/UL (ref 15–500)
ABSOLUTE LYMPHOCYTES: 1668 CELLS/UL (ref 850–3900)
ABSOLUTE MONOCYTES: 243 CELLS/UL (ref 200–950)
ABSOLUTE NEUTROPHILS: 1512 CELLS/UL (ref 1500–7800)
ALBUMIN/GLOBULIN RATIO: 2.2 (CALC) (ref 1–2.5)
ALBUMIN: 4.6 G/DL (ref 3.6–5.1)
ALKALINE PHOSPHATASE: 82 U/L (ref 37–153)
ALT: 26 U/L (ref 6–29)
AST: 26 U/L (ref 10–35)
BASOPHILS: 1.1 %
BILIRUBIN, TOTAL: 0.9 MG/DL (ref 0.2–1.2)
BUN: 15 MG/DL (ref 7–25)
CALCIUM: 9.5 MG/DL (ref 8.6–10.4)
CARBON DIOXIDE: 26 MMOL/L (ref 20–32)
CHLORIDE: 106 MMOL/L (ref 98–110)
CREATININE: 0.78 MG/DL (ref 0.5–1.05)
EGFR: 84 ML/MIN/1.73M2
EOSINOPHILS: 8.8 %
GLOBULIN: 2.1 G/DL (CALC) (ref 1.9–3.7)
GLUCOSE: 104 MG/DL (ref 65–139)
HEMATOCRIT: 42.4 % (ref 35–45)
HEMOGLOBIN: 13.7 G/DL (ref 11.7–15.5)
LYMPHOCYTES: 43.9 %
MCH: 31.6 PG (ref 27–33)
MCHC: 32.3 G/DL (ref 32–36)
MCV: 97.9 FL (ref 80–100)
MONOCYTES: 6.4 %
MPV: 11.1 FL (ref 7.5–12.5)
NEUTROPHILS: 39.8 %
PLATELET COUNT: 131 THOUSAND/UL (ref 140–400)
POTASSIUM: 4.3 MMOL/L (ref 3.5–5.3)
PROTEIN, TOTAL: 6.7 G/DL (ref 6.1–8.1)
RDW: 11.9 % (ref 11–15)
RED BLOOD CELL COUNT: 4.33 MILLION/UL (ref 3.8–5.1)
SODIUM: 140 MMOL/L (ref 135–146)
WHITE BLOOD CELL COUNT: 3.8 THOUSAND/UL (ref 3.8–10.8)

## 2025-08-22 RX ORDER — ROSUVASTATIN CALCIUM 5 MG/1
5 TABLET, COATED ORAL NIGHTLY
Qty: 90 TABLET | Refills: 3 | Status: SHIPPED | OUTPATIENT
Start: 2025-08-22